# Patient Record
Sex: MALE | Race: WHITE | NOT HISPANIC OR LATINO | Employment: FULL TIME | URBAN - METROPOLITAN AREA
[De-identification: names, ages, dates, MRNs, and addresses within clinical notes are randomized per-mention and may not be internally consistent; named-entity substitution may affect disease eponyms.]

---

## 2017-09-05 ENCOUNTER — OFFICE VISIT (OUTPATIENT)
Dept: CARDIOLOGY | Facility: CLINIC | Age: 27
End: 2017-09-05

## 2017-09-05 VITALS — HEART RATE: 102 BPM | SYSTOLIC BLOOD PRESSURE: 123 MMHG | DIASTOLIC BLOOD PRESSURE: 83 MMHG | WEIGHT: 230 LBS

## 2017-09-05 DIAGNOSIS — R07.2 PRECORDIAL PAIN: Primary | ICD-10-CM

## 2017-09-05 PROCEDURE — 99203 OFFICE O/P NEW LOW 30 MIN: CPT | Performed by: INTERNAL MEDICINE

## 2017-09-05 PROCEDURE — 93000 ELECTROCARDIOGRAM COMPLETE: CPT | Performed by: INTERNAL MEDICINE

## 2017-09-05 RX ORDER — TIZANIDINE 4 MG/1
4 TABLET ORAL
COMMUNITY
Start: 2017-08-23

## 2017-09-05 RX ORDER — ICOSAPENT ETHYL 1000 MG/1
CAPSULE ORAL
COMMUNITY
Start: 2017-08-01

## 2017-09-05 RX ORDER — CETIRIZINE HYDROCHLORIDE 10 MG/1
TABLET ORAL
COMMUNITY
Start: 2017-07-24

## 2017-09-05 NOTE — PROGRESS NOTES
Subjective:        CC  CP     Omid Early is a 27 y.o. male who complains of  chest pain. This consultation was requested by Dr. Douglas. Patient's symptoms last approximately several months, and are gradual in onset. He describes the symptoms as pressure-like, occurring during rest. The symptoms are worsened by nothing. Previous treatments/evaluations include: . Cardiac risk factors: .    History reviewed. No pertinent past medical history. reports that he has quit smoking. He does not have any smokeless tobacco history on file. He reports that he does not drink alcohol.  Family History   Problem Relation Age of Onset   • Hypertension Mother    • Heart failure Mother         Review of Systems  Constitutional: No wt loss, fever   Gastrointestinal: No nausea , abdominal pain  Behavioral/Psych: No insomnia or anxiety  Cardiovascular ----positive for cp. All other systems reviewed and are negative    Objective:       Physical Exam             Physical Exam  /83  Pulse 102  Wt 230 lb (104 kg)    General appearance: NAD, conversant   Eyes: anicteric sclerae, moist conjunctivae; no lid-lag; PERRLA   HENT: Atraumatic; oropharynx clear with moist mucous membranes and no mucosal ulcerations;  normal hard and soft palate   Neck: Trachea midline; FROM, supple, no thyromegaly or lymphadenopathy   Lungs: CTA, with normal respiratory effort and no intercostal retractions   CV: S1-S2 regular, no murmurs, no rub, no gallop   Abdomen: Soft, non-tender; no masses or HSM   Extremities: No peripheral edema or extremity lymphadenopathy  Skin: Normal temperature, turgor and texture; no rash, ulcers or subcutaneous nodules   Psych: Appropriate affect, alert and oriented to person, place and time           Cardiographics  @  ECG 12 Lead  Date/Time: 9/5/2017 3:12 PM  Performed by: ABHILASH LAMAS  Authorized by: ABHILASH LAMAS   Comparison: not compared with previous ECG   Previous ECG: no previous ECG  available  Rhythm: sinus rhythm  Clinical impression: normal ECG            Echocardiogram:     Imaging  Chest x-ray: not done     Lab Review   not applicable       Current Outpatient Prescriptions:   •  cetirizine (zyrTEC) 10 MG tablet, , Disp: , Rfl:   •  tiZANidine (ZANAFLEX) 4 MG tablet, Take 4 mg by mouth., Disp: , Rfl:   •  VASCEPA 1 g capsule capsule, , Disp: , Rfl:         Assessment:      1. Precordial pain        There is no problem list on file for this patient.        Plan:          1. Precordial pain  Patient chest pains appears to be atypical will need the further cardiac workup      ETT/ ECHO    SEE US 2-3 WKS        Counseling was given to Omid Early for the following topics:  risk factor reductions, prognosis and risks and benefits of treatment options .       SMOKING COUNSELING:    Counseling given: Not Answered      .  EMR Dragon/Transcription disclaimer:   Much of this encounter note is an electronic transcription/translation of spoken language to printed text. The electronic translation of spoken language may permit erroneous, or at times, nonsensical words or phrases to be inadvertently transcribed; Although I have reviewed the note for such errors, some may still exist.

## 2017-09-28 ENCOUNTER — HOSPITAL ENCOUNTER (OUTPATIENT)
Dept: CARDIOLOGY | Facility: HOSPITAL | Age: 27
Discharge: HOME | End: 2017-09-28
Attending: INTERNAL MEDICINE | Admitting: INTERNAL MEDICINE

## 2017-09-28 ENCOUNTER — HOSPITAL ENCOUNTER (OUTPATIENT)
Dept: CARDIOLOGY | Facility: HOSPITAL | Age: 27
Discharge: HOME | End: 2017-09-28
Attending: INTERNAL MEDICINE

## 2017-09-28 VITALS
SYSTOLIC BLOOD PRESSURE: 143 MMHG | DIASTOLIC BLOOD PRESSURE: 96 MMHG | HEART RATE: 92 BPM | HEIGHT: 70 IN | WEIGHT: 230 LBS | BODY MASS INDEX: 32.93 KG/M2

## 2017-09-28 VITALS
SYSTOLIC BLOOD PRESSURE: 127 MMHG | HEART RATE: 87 BPM | RESPIRATION RATE: 18 BRPM | OXYGEN SATURATION: 99 % | DIASTOLIC BLOOD PRESSURE: 88 MMHG

## 2017-09-28 DIAGNOSIS — R07.2 PRECORDIAL PAIN: ICD-10-CM

## 2017-09-28 LAB
BH CV ECHO MEAS - ACS: 2.2 CM
BH CV ECHO MEAS - AO MAX PG (FULL): 0 MMHG
BH CV ECHO MEAS - AO MAX PG: 4.2 MMHG
BH CV ECHO MEAS - AO MEAN PG (FULL): 0 MMHG
BH CV ECHO MEAS - AO MEAN PG: 2 MMHG
BH CV ECHO MEAS - AO ROOT AREA (BSA CORRECTED): 1.5
BH CV ECHO MEAS - AO ROOT AREA: 9.1 CM^2
BH CV ECHO MEAS - AO ROOT DIAM: 3.4 CM
BH CV ECHO MEAS - AO V2 MAX: 103 CM/SEC
BH CV ECHO MEAS - AO V2 MEAN: 71.5 CM/SEC
BH CV ECHO MEAS - AO V2 VTI: 18.2 CM
BH CV ECHO MEAS - AVA(I,A): 5 CM^2
BH CV ECHO MEAS - AVA(I,D): 5 CM^2
BH CV ECHO MEAS - AVA(V,A): 4.9 CM^2
BH CV ECHO MEAS - AVA(V,D): 4.9 CM^2
BH CV ECHO MEAS - BSA(HAYCOCK): 2.3 M^2
BH CV ECHO MEAS - BSA: 2.2 M^2
BH CV ECHO MEAS - BZI_BMI: 33 KILOGRAMS/M^2
BH CV ECHO MEAS - BZI_METRIC_HEIGHT: 177.8 CM
BH CV ECHO MEAS - BZI_METRIC_WEIGHT: 104.3 KG
BH CV ECHO MEAS - CONTRAST EF (2CH): 57 ML/M^2
BH CV ECHO MEAS - CONTRAST EF 4CH: 53.5 ML/M^2
BH CV ECHO MEAS - EDV(CUBED): 117.6 ML
BH CV ECHO MEAS - EDV(MOD-SP2): 86 ML
BH CV ECHO MEAS - EDV(MOD-SP4): 71 ML
BH CV ECHO MEAS - EDV(TEICH): 112.8 ML
BH CV ECHO MEAS - EF(CUBED): 74.7 %
BH CV ECHO MEAS - EF(MOD-SP2): 57 %
BH CV ECHO MEAS - EF(MOD-SP4): 53.5 %
BH CV ECHO MEAS - EF(TEICH): 66.4 %
BH CV ECHO MEAS - ESV(CUBED): 29.8 ML
BH CV ECHO MEAS - ESV(MOD-SP2): 37 ML
BH CV ECHO MEAS - ESV(MOD-SP4): 33 ML
BH CV ECHO MEAS - ESV(TEICH): 37.9 ML
BH CV ECHO MEAS - FS: 36.7 %
BH CV ECHO MEAS - IVS/LVPW: 1
BH CV ECHO MEAS - IVSD: 1 CM
BH CV ECHO MEAS - LAT PEAK E' VEL: 12 CM/SEC
BH CV ECHO MEAS - LV DIASTOLIC VOL/BSA (35-75): 32.1 ML/M^2
BH CV ECHO MEAS - LV MASS(C)D: 176 GRAMS
BH CV ECHO MEAS - LV MASS(C)DI: 79.5 GRAMS/M^2
BH CV ECHO MEAS - LV MAX PG: 4.2 MMHG
BH CV ECHO MEAS - LV MEAN PG: 2 MMHG
BH CV ECHO MEAS - LV SYSTOLIC VOL/BSA (12-30): 14.9 ML/M^2
BH CV ECHO MEAS - LV V1 MAX: 103 CM/SEC
BH CV ECHO MEAS - LV V1 MEAN: 69.1 CM/SEC
BH CV ECHO MEAS - LV V1 VTI: 18.7 CM
BH CV ECHO MEAS - LVIDD: 4.9 CM
BH CV ECHO MEAS - LVIDS: 3.1 CM
BH CV ECHO MEAS - LVLD AP2: 8.2 CM
BH CV ECHO MEAS - LVLD AP4: 8 CM
BH CV ECHO MEAS - LVLS AP2: 7.1 CM
BH CV ECHO MEAS - LVLS AP4: 6.8 CM
BH CV ECHO MEAS - LVOT AREA (M): 4.9 CM^2
BH CV ECHO MEAS - LVOT AREA: 4.9 CM^2
BH CV ECHO MEAS - LVOT DIAM: 2.5 CM
BH CV ECHO MEAS - LVPWD: 1 CM
BH CV ECHO MEAS - MED PEAK E' VEL: 8.2 CM/SEC
BH CV ECHO MEAS - MV A DUR: 0.12 SEC
BH CV ECHO MEAS - MV A MAX VEL: 56.8 CM/SEC
BH CV ECHO MEAS - MV DEC SLOPE: 375 CM/SEC^2
BH CV ECHO MEAS - MV DEC TIME: 0.11 SEC
BH CV ECHO MEAS - MV E MAX VEL: 59.2 CM/SEC
BH CV ECHO MEAS - MV E/A: 1
BH CV ECHO MEAS - MV MAX PG: 2.2 MMHG
BH CV ECHO MEAS - MV MEAN PG: 1 MMHG
BH CV ECHO MEAS - MV P1/2T MAX VEL: 67.3 CM/SEC
BH CV ECHO MEAS - MV P1/2T: 52.6 MSEC
BH CV ECHO MEAS - MV V2 MAX: 74.4 CM/SEC
BH CV ECHO MEAS - MV V2 MEAN: 55.5 CM/SEC
BH CV ECHO MEAS - MV V2 VTI: 15.2 CM
BH CV ECHO MEAS - MVA P1/2T LCG: 3.3 CM^2
BH CV ECHO MEAS - MVA(P1/2T): 4.2 CM^2
BH CV ECHO MEAS - MVA(VTI): 6 CM^2
BH CV ECHO MEAS - PA ACC TIME: 0.08 SEC
BH CV ECHO MEAS - PA MAX PG (FULL): 4.4 MMHG
BH CV ECHO MEAS - PA MAX PG: 5.5 MMHG
BH CV ECHO MEAS - PA PR(ACCEL): 43.5 MMHG
BH CV ECHO MEAS - PA V2 MAX: 117 CM/SEC
BH CV ECHO MEAS - PULM A REVS DUR: 0.1 SEC
BH CV ECHO MEAS - PULM A REVS VEL: 26.6 CM/SEC
BH CV ECHO MEAS - PULM DIAS VEL: 29 CM/SEC
BH CV ECHO MEAS - PULM S/D: 1.3
BH CV ECHO MEAS - PULM SYS VEL: 38.8 CM/SEC
BH CV ECHO MEAS - PVA(V,A): 1.9 CM^2
BH CV ECHO MEAS - PVA(V,D): 1.9 CM^2
BH CV ECHO MEAS - QP/QS: 0.47
BH CV ECHO MEAS - RAP SYSTOLE: 3 MMHG
BH CV ECHO MEAS - RV MAX PG: 1.1 MMHG
BH CV ECHO MEAS - RV MEAN PG: 1 MMHG
BH CV ECHO MEAS - RV V1 MAX: 52.5 CM/SEC
BH CV ECHO MEAS - RV V1 MEAN: 33.6 CM/SEC
BH CV ECHO MEAS - RV V1 VTI: 10.3 CM
BH CV ECHO MEAS - RVDD: 2.4 CM
BH CV ECHO MEAS - RVOT AREA: 4.2 CM^2
BH CV ECHO MEAS - RVOT DIAM: 2.3 CM
BH CV ECHO MEAS - RVSP: 16.4 MMHG
BH CV ECHO MEAS - SI(AO): 74.6 ML/M^2
BH CV ECHO MEAS - SI(CUBED): 39.7 ML/M^2
BH CV ECHO MEAS - SI(LVOT): 41.4 ML/M^2
BH CV ECHO MEAS - SI(MOD-SP2): 22.1 ML/M^2
BH CV ECHO MEAS - SI(MOD-SP4): 17.2 ML/M^2
BH CV ECHO MEAS - SI(TEICH): 33.8 ML/M^2
BH CV ECHO MEAS - SV(AO): 165.2 ML
BH CV ECHO MEAS - SV(CUBED): 87.9 ML
BH CV ECHO MEAS - SV(LVOT): 91.8 ML
BH CV ECHO MEAS - SV(MOD-SP2): 49 ML
BH CV ECHO MEAS - SV(MOD-SP4): 38 ML
BH CV ECHO MEAS - SV(RVOT): 42.8 ML
BH CV ECHO MEAS - SV(TEICH): 74.9 ML
BH CV ECHO MEAS - TAPSE (>1.6): 1.3 CM2
BH CV ECHO MEAS - TR MAX VEL: 183 CM/SEC
BH CV STRESS BP STAGE 1: NORMAL
BH CV STRESS BP STAGE 2: NORMAL
BH CV STRESS BP STAGE 3: NORMAL
BH CV STRESS BP STAGE 4: NORMAL
BH CV STRESS DURATION MIN STAGE 1: 3
BH CV STRESS DURATION MIN STAGE 2: 3
BH CV STRESS DURATION MIN STAGE 3: 3
BH CV STRESS DURATION MIN STAGE 4: 0
BH CV STRESS DURATION SEC STAGE 1: 0
BH CV STRESS DURATION SEC STAGE 2: 0
BH CV STRESS DURATION SEC STAGE 3: 0
BH CV STRESS DURATION SEC STAGE 4: 34
BH CV STRESS GRADE STAGE 1: 10
BH CV STRESS GRADE STAGE 2: 12
BH CV STRESS GRADE STAGE 3: 14
BH CV STRESS GRADE STAGE 4: 16
BH CV STRESS HR STAGE 1: 136
BH CV STRESS HR STAGE 2: 148
BH CV STRESS HR STAGE 3: 169
BH CV STRESS HR STAGE 4: 171
BH CV STRESS METS STAGE 1: 4.6
BH CV STRESS METS STAGE 2: 7
BH CV STRESS METS STAGE 3: 10.1
BH CV STRESS METS STAGE 4: 10.5
BH CV STRESS O2 STAGE 2: 99
BH CV STRESS O2 STAGE 3: 99
BH CV STRESS O2 STAGE 4: 99
BH CV STRESS PROTOCOL 1: NORMAL
BH CV STRESS RECOVERY BP: NORMAL MMHG
BH CV STRESS RECOVERY HR: 100 BPM
BH CV STRESS RECOVERY O2: 100 %
BH CV STRESS SPEED STAGE 1: 1.7
BH CV STRESS SPEED STAGE 2: 2.5
BH CV STRESS SPEED STAGE 3: 3.4
BH CV STRESS SPEED STAGE 4: 4.2
BH CV STRESS STAGE 1: 1
BH CV STRESS STAGE 2: 2
BH CV STRESS STAGE 3: 3
BH CV STRESS STAGE 4: 4
BH CV XLRA - RV BASE: 3.3 CM
BH CV XLRA - RV LENGTH: 6.7 CM
BH CV XLRA - RV MID: 2.8 CM
BH CV XLRA - TDI S': 9.5 CM/SEC
E/E' RATIO: 6.1
LEFT ATRIUM VOLUME INDEX: 22.6 ML/M2
MAXIMAL PREDICTED HEART RATE: 192 BPM
PERCENT MAX PREDICTED HR: 89.06 %
STRESS BASELINE BP: NORMAL MMHG
STRESS BASELINE HR: 96 BPM
STRESS O2 SAT REST: 99 %
STRESS PERCENT HR: 105 %
STRESS POST ESTIMATED WORKLOAD: 10.5 METS
STRESS POST EXERCISE DUR MIN: 9 MIN
STRESS POST EXERCISE DUR SEC: 34 SEC
STRESS POST O2 SAT PEAK: 99 %
STRESS POST PEAK BP: NORMAL MMHG
STRESS POST PEAK HR: 171 BPM
STRESS TARGET HR: 163 BPM

## 2017-09-28 PROCEDURE — 93018 CV STRESS TEST I&R ONLY: CPT | Performed by: INTERNAL MEDICINE

## 2017-09-28 PROCEDURE — 0399T HC MYOCARDL STRAIN IMAG QUAN ASSMT PER SESS: CPT

## 2017-09-28 PROCEDURE — 93016 CV STRESS TEST SUPVJ ONLY: CPT | Performed by: INTERNAL MEDICINE

## 2017-09-28 PROCEDURE — 93306 TTE W/DOPPLER COMPLETE: CPT | Performed by: INTERNAL MEDICINE

## 2017-09-28 PROCEDURE — 93017 CV STRESS TEST TRACING ONLY: CPT

## 2017-09-28 PROCEDURE — 0399T ADULT TRANSTHORACIC ECHO COMPLETE: CPT | Performed by: INTERNAL MEDICINE

## 2017-09-28 PROCEDURE — 93306 TTE W/DOPPLER COMPLETE: CPT

## 2017-10-03 ENCOUNTER — OFFICE VISIT (OUTPATIENT)
Dept: CARDIOLOGY | Facility: CLINIC | Age: 27
End: 2017-10-03

## 2017-10-03 VITALS
BODY MASS INDEX: 33.86 KG/M2 | WEIGHT: 236 LBS | DIASTOLIC BLOOD PRESSURE: 85 MMHG | HEART RATE: 109 BPM | SYSTOLIC BLOOD PRESSURE: 123 MMHG

## 2017-10-03 DIAGNOSIS — R07.2 PRECORDIAL PAIN: Primary | ICD-10-CM

## 2017-10-03 PROCEDURE — 99212 OFFICE O/P EST SF 10 MIN: CPT | Performed by: INTERNAL MEDICINE

## 2017-10-03 NOTE — PROGRESS NOTES
Subjective:       Omid Early is a 27 y.o. male who here for follow up    CC  Follow up ETT/ ECHO  HPI  27-year-old female here for the follow-up after evaluated for the chest pain approximately one month ago had ETT and echocardiogram which are normal     Problem List Items Addressed This Visit     None      Visit Diagnoses     Precordial pain    -  Primary        .    The following portions of the patient's history were reviewed and updated as appropriate: allergies, current medications, past family history, past medical history, past social history, past surgical history and problem list.    History reviewed. No pertinent past medical history. reports that he has quit smoking. He has never used smokeless tobacco. He reports that he does not drink alcohol or use illicit drugs.  Family History   Problem Relation Age of Onset   • Hypertension Mother    • Heart failure Mother    • Heart disease Mother        Review of Systems  Constitutional: No wt loss, fever, fatigue  Gastrointestinal: No nausea, abdominal pain  Behavioral/Psych: No insomnia or anxiety   Cardiovascular Chest pain  Objective:       Physical Exam             Physical Exam  /85  Pulse 109  Wt 236 lb (107 kg)  BMI 33.86 kg/m2    General appearance: NAD, conversant   Eyes: anicteric sclerae, moist conjunctivae; no lid-lag; PERRLA   HENT: Atraumatic; oropharynx clear with moist mucous membranes and no mucosal ulcerations;  normal hard and soft palate   Neck: Trachea midline; FROM, supple, no thyromegaly or lymphadenopathy   Lungs: CTA, with normal respiratory effort and no intercostal retractions   CV: S1-S2 regular, no murmurs, no rub, no gallop   Abdomen: Soft, non-tender; no masses or HSM   Extremities: No peripheral edema or extremity lymphadenopathy  Skin: Normal temperature, turgor and texture; no rash, ulcers or subcutaneous nodules   Psych: Appropriate affect, alert and oriented to person, place and time            Cardiographics  @Procedures    Echocardiogram:        Current Outpatient Prescriptions:   •  cetirizine (zyrTEC) 10 MG tablet, , Disp: , Rfl:   •  tiZANidine (ZANAFLEX) 4 MG tablet, Take 4 mg by mouth., Disp: , Rfl:   •  VASCEPA 1 g capsule capsule, , Disp: , Rfl:    Assessment:        There is no problem list on file for this patient.    · Arrhythmias were not significant.  · No ECG evidence of myocardial ischemia.Negative clinical evidence of myocardial ischemia. Findings consistent with a normal ECG stress test       Interpretation Summary   · Low normal global strain -16.5  · Left Ventricle: Calculated EF = 53.5%.  · Trace-to-mild mitral valve regurgitation  · Trace to mild tricuspid valve regurgitation is present.  · There is no evidence of pericardial effusion.         Specificity and sensitivity of the stress test has been explained. Pt has been explained if  Symptoms continue please go to ER, and further w/p will be required.     Plan:            ICD-10-CM ICD-9-CM   1. Precordial pain R07.2 786.51     1. Precordial pain  Appears noncardiac    Specificity and sensitivity of the stress test has been explained. Pt has been explained if  Symptoms continue please go to ER, and further w/p will be required.      See us as needed  COUNSELING:    Omid Hoffman was given to patient for the following topics: diagnostic results, risk factor reductions, impressions, risks and benefits of treatment options and importance of treatment compliance .       SMOKING COUNSELING:    Counseling given: Not Answered      EMR Dragon/Transcription disclaimer:   Much of this encounter note is an electronic transcription/translation of spoken language to printed text. The electronic translation of spoken language may permit erroneous, or at times, nonsensical words or phrases to be inadvertently transcribed; Although I have reviewed the note for such errors, some may still exist.

## 2022-04-08 ENCOUNTER — TRANSCRIBE ORDERS (OUTPATIENT)
Dept: PHYSICAL THERAPY | Facility: CLINIC | Age: 32
End: 2022-04-08

## 2022-04-08 DIAGNOSIS — S60.211A CONTUSION OF RIGHT WRIST, INITIAL ENCOUNTER: Primary | ICD-10-CM

## 2022-04-11 ENCOUNTER — TREATMENT (OUTPATIENT)
Dept: PHYSICAL THERAPY | Facility: CLINIC | Age: 32
End: 2022-04-11

## 2022-04-11 DIAGNOSIS — M65.4 RADIAL STYLOID TENOSYNOVITIS (DE QUERVAIN): ICD-10-CM

## 2022-04-11 DIAGNOSIS — M79.631 PAIN OF RIGHT FOREARM: ICD-10-CM

## 2022-04-11 DIAGNOSIS — M25.531 RIGHT WRIST PAIN: Primary | ICD-10-CM

## 2022-04-11 PROCEDURE — 97035 APP MDLTY 1+ULTRASOUND EA 15: CPT | Performed by: PHYSICAL THERAPIST

## 2022-04-11 PROCEDURE — 97110 THERAPEUTIC EXERCISES: CPT | Performed by: PHYSICAL THERAPIST

## 2022-04-11 PROCEDURE — 97161 PT EVAL LOW COMPLEX 20 MIN: CPT | Performed by: PHYSICAL THERAPIST

## 2022-04-11 PROCEDURE — 97140 MANUAL THERAPY 1/> REGIONS: CPT | Performed by: PHYSICAL THERAPIST

## 2022-04-11 NOTE — PROGRESS NOTES
Physical Therapy Initial Evaluation and Plan of Care    Patient: Omid Early   : 1990  Diagnosis/ICD-10 Code:  Right wrist pain [M25.531]  Referring practitioner: Unruly Dean MD  Date of Initial Visit: 2022  Today's Date: 2022  Patient seen for 1 session         Visit Diagnoses:    ICD-10-CM ICD-9-CM   1. Right wrist pain  M25.531 719.43   2. Pain of right forearm  M79.631 729.5   3. Radial styloid tenosynovitis (de quervain)  M65.4 727.04         Subjective Questionnaire: QuickDASH: 50      Subjective Evaluation    History of Present Illness  Date of onset: 3/25/2022  Mechanism of injury: moving equipment a candice came out of place and hit him in distal forearm; came straight to [facility]; X-ray neg for fx and put in brace and put on 5 lb restriction.  Was trying to do oil changes but causing pain so now on no use of R hand.  On steroid nirav which has helped.  Doing paperwork and other light activities at work today gives it an ache at times.  Last year at work caught self with R hand during a fall - got better on it's own.  No signif PMH reported      Patient Occupation: advanced lube tech   Precautions and Work Restrictions: no use RUEPain  Current pain ratin  At worst pain ratin  Location: radial wrist and distal dorsal forearm  Quality: dull ache and sharp  Relieving factors: rest, support, ice and medications  Aggravating factors: lifting (grasping)  Progression: improved    Hand dominance: right    Diagnostic Tests  X-ray: normal    Treatments  Previous treatment: medication and immobilization  Current treatment: physical therapy  Patient Goals  Patient goals for therapy: decreased pain and return to work             Objective          Observations     Additional Wrist/Hand Observation Details  Cock-up wrist splint    Tenderness     Right Wrist/Hand   Tenderness in the first dorsal compartment and intersection dorsal forearm area.     Additional Tenderness Details  Radiating  pain into forearm with palpation at anatomical snuff box    Active Range of Motion     Left Wrist   Normal active range of motion    Right Wrist   Wrist flexion: 53 degrees   Wrist extension: 37 degrees   Radial deviation: 18 degrees   Ulnar deviation: 24 degrees     Passive Range of Motion     Additional Passive Range of Motion Details  Guarded but not blocked    Strength/Myotome Testing     Left Wrist/Hand   Normal wrist strength     (2nd hand position)     Trial 1: 91 lbs    Trial 2: 89 lbs    Trial 3: 89 lbs    Average: 89.67 lbs    Right Wrist/Hand   Wrist extension: 4-  Wrist flexion: 4  Radial deviation: 4-  Ulnar deviation: 4-     (2nd hand position)     Trial 1: 35 lbs    Trial 2: 39 lbs    Trial 3: 49 lbs    Average: 41 lbs    Tests     Right Wrist/Hand   Positive Finkelstein's.           Assessment & Plan     Assessment  Impairments: abnormal or restricted ROM, activity intolerance, impaired physical strength and pain with function  Functional Limitations: lifting, pulling, pushing and uncomfortable because of pain  Assessment details: 31 yo M with acute injury from trauma at work presents with signs/sxs inflammation/tendinitis without obvious internal derangement. Should benefit from PT to address deficits of limited and painful ROM, weakness and inflammation to restore back to normal use of the UE for ADLs  Prognosis: good    Goals  Plan Goals: STGs x 1 wk  1. Pt demos tolerance for and compliance with HEP  2. ROM increased to 50-75% for improved ADLs  3. Pain with ADLs < 4/10  4. Review body mechanics/ergonomics with work tasks/ADLs    LTGs x 3 wks  1. Pt. Independent with HEP and body mechanics for prevention  2. ROM increased to % for return to normal ADLs  3.  within 15 lbs of uninvolved hand for improved ADLs  4. Able to RTW and/or prior level ADLs without restriction by demonstration of lift and push/pull 50 lbs    Plan  Therapy options: will be seen for skilled therapy  services  Planned modality interventions: cryotherapy and ultrasound  Planned therapy interventions: manual therapy, therapeutic activities, stretching, strengthening, home exercise program, body mechanics training and functional ROM exercises  Frequency: 3x week  Duration in weeks: 3  Treatment plan discussed with: patient        History # of Personal Factors and/or Comorbidities: LOW (0)  Examination of Body System(s): # of elements: LOW (1-2)  Clinical Presentation: STABLE   Clinical Decision Making: LOW       Timed:         Manual Therapy:    8     mins  61894;     Therapeutic Exercise:    10     mins  48010;     Neuromuscular Yan:    0    mins  47916;    Therapeutic Activity:     0     mins  67838;     Gait Trainin     mins  74296;     Ultrasound:     8     mins  84937;    Ionto                               0    mins   47586  Self Care                       0     mins   01192  Canalith Repos    0     mins 75264      Un-Timed:  Electrical Stimulation:    0     mins  94519 ( );  Dry Needling     0     mins self-pay  Traction     0     mins 89482  Low Eval     20     Mins  80705  Mod Eval     0     Mins  75942  High Eval                       0     Mins  88977        Timed Treatment:   26   mins   Total Treatment:     56   mins          PT: Chrissie Emanuel PT     License Number: [id number]  Electronically signed by Chrissie Emanuel PT, 22, 12:03 PM EDT    Certification Period: 2022 thru 2022  I certify that the therapy services are furnished while this patient is under my care.  The services outlined above are required by this patient, and will be reviewed every 90 days.         Physician Signature:__________________________________________________    PHYSICIAN: Unruly Dean MD  NPI: [id number]                                      DATE:      Please sign and return via fax to [email or web address] . Thank you, [facility].

## 2022-04-14 ENCOUNTER — TREATMENT (OUTPATIENT)
Dept: PHYSICAL THERAPY | Facility: CLINIC | Age: 32
End: 2022-04-14

## 2022-04-14 DIAGNOSIS — M79.631 PAIN OF RIGHT FOREARM: ICD-10-CM

## 2022-04-14 DIAGNOSIS — M25.531 RIGHT WRIST PAIN: Primary | ICD-10-CM

## 2022-04-14 DIAGNOSIS — M65.4 RADIAL STYLOID TENOSYNOVITIS (DE QUERVAIN): ICD-10-CM

## 2022-04-14 PROCEDURE — 97110 THERAPEUTIC EXERCISES: CPT | Performed by: PHYSICAL THERAPIST

## 2022-04-14 PROCEDURE — 97035 APP MDLTY 1+ULTRASOUND EA 15: CPT | Performed by: PHYSICAL THERAPIST

## 2022-04-14 PROCEDURE — 97140 MANUAL THERAPY 1/> REGIONS: CPT | Performed by: PHYSICAL THERAPIST

## 2022-04-14 NOTE — PROGRESS NOTES
Physical Therapy Daily Progress Note      Visit # 2      Subjective   Feeling a lot better and moving a little better.  Having some pain over here now (ulnar forearm)    Objective   See Exercise, Manual, and Modality Logs for complete treatment.       Assessment/Plan    Increased AROM, less soft tissue restrictions noted radial forearm with IASTM but some minor restrictions ulnar and decreased c/o pain - all indicating improvement.    Reassess for MD             Manual Therapy:    8     mins  49477;  Therapeutic Exercise:    20     mins  09173;     Neuromuscular Yan:    0    mins  89556;    Therapeutic Activity:     0     mins  57964;     Gait Trainin     mins  43681;     Ultrasound:     8     mins  90450;    Work Hardening           0      mins 41619  Iontophoresis               0   mins 54279  Estim   0 min 96952      Timed Treatment:   36   mins   Total Treatment:     46   mins    Chrissie Emanuel PT  Physical Therapist  [place] License #[id number]

## 2022-04-15 ENCOUNTER — TREATMENT (OUTPATIENT)
Dept: PHYSICAL THERAPY | Facility: CLINIC | Age: 32
End: 2022-04-15

## 2022-04-15 DIAGNOSIS — M65.4 RADIAL STYLOID TENOSYNOVITIS (DE QUERVAIN): ICD-10-CM

## 2022-04-15 DIAGNOSIS — M79.631 PAIN OF RIGHT FOREARM: ICD-10-CM

## 2022-04-15 DIAGNOSIS — M25.531 RIGHT WRIST PAIN: Primary | ICD-10-CM

## 2022-04-15 PROCEDURE — 97140 MANUAL THERAPY 1/> REGIONS: CPT | Performed by: PHYSICAL THERAPIST

## 2022-04-15 PROCEDURE — 97110 THERAPEUTIC EXERCISES: CPT | Performed by: PHYSICAL THERAPIST

## 2022-04-15 PROCEDURE — 97530 THERAPEUTIC ACTIVITIES: CPT | Performed by: PHYSICAL THERAPIST

## 2022-04-15 PROCEDURE — 97035 APP MDLTY 1+ULTRASOUND EA 15: CPT | Performed by: PHYSICAL THERAPIST

## 2022-04-15 NOTE — PROGRESS NOTES
Physical Therapy  Progress Note          4/15/2022  Unruly Dean MD    Re: Omid Early  ________________________________________________________________    Mr. Omid Early, has attended 3 PT sessions.  Treatment has consisted of: there-ex, HEP, manual, modalities and pt ed     S: Mr. Omid Early states: forearm started hurting/aching about 2 hours after PT yesterday. May have stretched harder than I should have.        Subjective     Objective          Observations     Additional Wrist/Hand Observation Details  Cock-up wrist splint    Tenderness     Additional Tenderness Details  pain with palpation at anatomical snuff box but less than at eval; only mild TTP distal ext mm    Active Range of Motion     Left Wrist   Normal active range of motion    Right Wrist   Wrist flexion: 48 degrees   Wrist extension: 62 degrees   Radial deviation: 24 degrees   Ulnar deviation: 46 degrees     Left Thumb   Opposition: Thumbs = B    Passive Range of Motion     Additional Passive Range of Motion Details  Guarded but not blocked    Strength/Myotome Testing     Left Wrist/Hand   Normal wrist strength     (2nd hand position)     Trial 1: 80 lbs    Trial 2: 76 lbs    Trial 3: 79 lbs    Average: 78.33 lbs    Right Wrist/Hand   Wrist extension: 4-  Wrist flexion: 4+  Radial deviation: 4+  Ulnar deviation: 4+     (2nd hand position)     Trial 1: 44 lbs    Trial 2: 48 lbs    Trial 3: 39 lbs    Average: 43.67 lbs    Tests     Right Wrist/Hand   Positive scapholunate shear.     Additional Tests Details  Pain in Finklestein position of UD but no increased pain with resisted RD    Functional Assessment     Comments  Tolerated 2-hand box lifts at 5 and 10 lbs but reported a slight strain at 15 lbs and then ~ a minute later reported more pain      See Exercise, Manual, and Modality Logs for complete treatment.       Assessment/Plan    Overall improved with increased ROM (except wrist flexion) and increased strength and  decreased tenderness.  Still though with moderate pain and limited mobility and strength.  No hard signs of internal derangement but is  around scaphoid.  Could benefit from additional PT.    To MD             Manual Therapy:    9     mins  41933;  Therapeutic Exercise:    14     mins  61443;     Neuromuscular Yan:    0    mins  70133;    Therapeutic Activity:     14     mins  64483;     Gait Trainin     mins  41887;     Ultrasound:     8     mins  31940;    Work Hardening           0      mins 32493  Iontophoresis               0   mins 72562    Timed Treatment:   45   mins   Total Treatment:     45   mins    Chrissie Emanuel PT  Physical Therapist

## 2022-04-18 ENCOUNTER — TREATMENT (OUTPATIENT)
Dept: PHYSICAL THERAPY | Facility: CLINIC | Age: 32
End: 2022-04-18

## 2022-04-18 DIAGNOSIS — M65.4 RADIAL STYLOID TENOSYNOVITIS (DE QUERVAIN): ICD-10-CM

## 2022-04-18 DIAGNOSIS — M25.531 RIGHT WRIST PAIN: Primary | ICD-10-CM

## 2022-04-18 DIAGNOSIS — M79.631 PAIN OF RIGHT FOREARM: ICD-10-CM

## 2022-04-18 PROCEDURE — 97110 THERAPEUTIC EXERCISES: CPT | Performed by: PHYSICAL THERAPIST

## 2022-04-18 PROCEDURE — 97035 APP MDLTY 1+ULTRASOUND EA 15: CPT | Performed by: PHYSICAL THERAPIST

## 2022-04-18 PROCEDURE — 97140 MANUAL THERAPY 1/> REGIONS: CPT | Performed by: PHYSICAL THERAPIST

## 2022-04-18 NOTE — PROGRESS NOTES
Physical Therapy Daily Progress Note      Visit # 4      Subjective   I'm doing a lot better    Objective   See Exercise, Manual, and Modality Logs for complete treatment.       Assessment/Plan    Good improvement since last session with decreased c/o pain and tolerance for increased exercises with only mild discomfort reported.  Decreased soft tisue restrictions noted with IASTM    Continue to progress per POC as kodi             Manual Therapy:    8     mins  33573;  Therapeutic Exercise:    26     mins  99954;     Neuromuscular Yan:    0    mins  16739;    Therapeutic Activity:     0     mins  68542;     Gait Trainin     mins  91866;     Ultrasound:     8     mins  45056;    Work Hardening           0      mins 11744  Iontophoresis               0   mins 90812  Estim   0 min 54741      Timed Treatment:   42   mins   Total Treatment:     52   mins    Chrissie Emanuel PT  Physical Therapist  [place] License #[id number]

## 2022-04-20 ENCOUNTER — TREATMENT (OUTPATIENT)
Dept: PHYSICAL THERAPY | Facility: CLINIC | Age: 32
End: 2022-04-20

## 2022-04-20 DIAGNOSIS — M79.631 PAIN OF RIGHT FOREARM: ICD-10-CM

## 2022-04-20 DIAGNOSIS — M65.4 RADIAL STYLOID TENOSYNOVITIS (DE QUERVAIN): ICD-10-CM

## 2022-04-20 DIAGNOSIS — M25.531 RIGHT WRIST PAIN: Primary | ICD-10-CM

## 2022-04-20 PROCEDURE — 97110 THERAPEUTIC EXERCISES: CPT | Performed by: PHYSICAL THERAPIST

## 2022-04-20 PROCEDURE — 97035 APP MDLTY 1+ULTRASOUND EA 15: CPT | Performed by: PHYSICAL THERAPIST

## 2022-04-20 PROCEDURE — 97530 THERAPEUTIC ACTIVITIES: CPT | Performed by: PHYSICAL THERAPIST

## 2022-04-20 NOTE — PROGRESS NOTES
Physical Therapy Daily Progress Note      Visit # 5      Subjective   A little sore after last session but not bad.    Objective   See Exercise, Manual, and Modality Logs for complete treatment.       Assessment/Plan    Gradual improvement with decreased c/o pain and decreasing soft tissue restrictions, increased mobility and tolerance for strengthening.  Mild pain reported at 40 and 50 lbs with lifts.    Reassess for MD             Manual Therapy:    6     mins  96782;  Therapeutic Exercise:    27     mins  00664;     Neuromuscular Yan:    0    mins  67341;    Therapeutic Activity:     10     mins  73346;     Gait Trainin     mins  25985;     Ultrasound:     8     mins  41001;    Work Hardening           0      mins 55026  Iontophoresis               0   mins 35644  Estim   0 min 64982      Timed Treatment:   51   mins   Total Treatment:     61   mins    Chrissie Emanuel PT  Physical Therapist  [place] License #[id number]

## 2022-04-22 ENCOUNTER — TREATMENT (OUTPATIENT)
Dept: PHYSICAL THERAPY | Facility: CLINIC | Age: 32
End: 2022-04-22

## 2022-04-22 DIAGNOSIS — M79.631 PAIN OF RIGHT FOREARM: ICD-10-CM

## 2022-04-22 DIAGNOSIS — M25.531 RIGHT WRIST PAIN: Primary | ICD-10-CM

## 2022-04-22 DIAGNOSIS — M65.4 RADIAL STYLOID TENOSYNOVITIS (DE QUERVAIN): ICD-10-CM

## 2022-04-22 PROCEDURE — 97110 THERAPEUTIC EXERCISES: CPT | Performed by: PHYSICAL THERAPIST

## 2022-04-22 PROCEDURE — 97530 THERAPEUTIC ACTIVITIES: CPT | Performed by: PHYSICAL THERAPIST

## 2022-04-22 PROCEDURE — 97035 APP MDLTY 1+ULTRASOUND EA 15: CPT | Performed by: PHYSICAL THERAPIST

## 2022-04-22 NOTE — PROGRESS NOTES
Physical Therapy  Progress Note          4/22/2022     To: LAVELLE Quintanilla    Re: Omid Early  ________________________________________________________________    Mr. Omid Early, has attended 6/6 PT sessions.  Treatment has consisted of: progressive there-ex/act, HEP, manual, modalities and pt ed     S: Mr. Omid Early states: aching for a while after doing the lifting the other day in PT. [place] ok during the lifting.        Subjective     Objective          Observations     Additional Wrist/Hand Observation Details  Cock-up wrist splint    Tenderness     Additional Tenderness Details  No immediate tenderness reported on palpation but reported an achyness shortly after at mid to ulnar dorsal wrist and distal forearm    Active Range of Motion     Left Wrist   Normal active range of motion    Right Wrist   Wrist flexion: 58 degrees   Wrist extension: 72 degrees   Radial deviation: 35 degrees   Ulnar deviation: 46 degrees     Left Thumb   Opposition: Thumbs = B    Strength/Myotome Testing     Left Wrist/Hand   Normal wrist strength     (2nd hand position)     Trial 1: 75 lbs    Trial 2: 71 lbs    Trial 3: 65 lbs    Average: 70.33 lbs    Right Wrist/Hand   Wrist extension: 5  Right wrist flexion strength: 5-/5.  Right wrist radial deviation strength: 5-/5.  Ulnar deviation: 4+     (2nd hand position)     Trial 1: 53 lbs    Trial 2: 54 lbs    Trial 3: 56 lbs    Average: 54.33 lbs    Tests     Right Wrist/Hand   Negative scapholunate shear.     Additional Tests Details  Pain in Finklestein position of UD both wrists but no increased pain with resisted RD    Functional Assessment     Comments  Tolerated lifting up to 50 lbs 2-handed on last visit but reported his wrist/forearm started having aching pain after at about 3/10 that lasted throughout the rest of the day.      See Exercise, Manual, and Modality Logs for complete treatment.       Assessment/Plan    Continued overall improvement with wrist  and  strength nearing = to uninvolved.  ROM now ~ WNL without pain except pain in Finklestien position but this is bilaterally.  No obvious internal derangement but does have some intermittent popping at wrist.  Still not tolerating lifting at level required by job but has improved.    Recommend another week of PT to further strengthen/stabilize.             Manual Therapy:    2     mins  39637;  Therapeutic Exercise:    32     mins  28244;     Neuromuscular Yan:    0    mins  04125;    Therapeutic Activity:     12     mins  86068;     Gait Trainin     mins  62200;     Ultrasound:     8     mins  48988;    Work Hardening           0      mins 62243  Iontophoresis               0   mins 79984    Timed Treatment:   54   mins   Total Treatment:     54   mins    Chrissie Emanuel PT  Physical Therapist